# Patient Record
Sex: MALE | Race: BLACK OR AFRICAN AMERICAN | Employment: OTHER | URBAN - METROPOLITAN AREA
[De-identification: names, ages, dates, MRNs, and addresses within clinical notes are randomized per-mention and may not be internally consistent; named-entity substitution may affect disease eponyms.]

---

## 2022-08-16 ENCOUNTER — HOSPITAL ENCOUNTER (EMERGENCY)
Age: 37
Discharge: HOME | End: 2022-08-16
Attending: EMERGENCY MEDICINE
Payer: OTHER GOVERNMENT

## 2022-08-16 VITALS
TEMPERATURE: 98.2 F | SYSTOLIC BLOOD PRESSURE: 161 MMHG | WEIGHT: 204 LBS | HEART RATE: 91 BPM | HEIGHT: 67 IN | BODY MASS INDEX: 32.02 KG/M2 | OXYGEN SATURATION: 98 % | DIASTOLIC BLOOD PRESSURE: 107 MMHG | RESPIRATION RATE: 18 BRPM

## 2022-08-16 DIAGNOSIS — U07.1 POSITIVE SELF-ADMINISTERED ANTIGEN TEST FOR COVID-19: Primary | ICD-10-CM

## 2022-08-16 PROCEDURE — 99282 EMERGENCY DEPT VISIT SF MDM: CPT

## 2022-08-16 NOTE — ED TRIAGE NOTES
Started Monday runny, yellow mucus productive cough, taste lost, sinus pressure, no sob, chest pain or N, V, diarrhea.   Wants to be checked out.+ covid yesterday, and vaccinated x 2 for covid

## 2022-08-16 NOTE — Clinical Note
[address] [id number]  [phone]    Work/School Note    Date: 8/16/2022     To Whom It May concern:    Christell Ahumada was evaluated by the following provider(s):  Attending Provider: Roxjessica Messing virus is suspected. Per the CDC guidelines we recommend home isolation until the following conditions are all met:    1. At least five days have passed since symptoms first appeared and/or had a close exposure,   2. After home isolation for five days, wearing a mask around others for the next five days,  3. At least 24 have passed since last fever without the use of fever-reducing medications and  4.  Symptoms (eg cough, shortness of breath) have improved      Sincerely,          Nicole Rodriguez,

## 2022-08-16 NOTE — ED PROVIDER NOTES
EMERGENCY DEPARTMENT HISTORY AND PHYSICAL EXAM      Date: 8/16/2022  Patient Name: Ricky Murrell    History of Presenting Illness     Chief Complaint   Patient presents with    Positive For Covid-19    Cough    Sinus Infection       History Provided By: Patient    HPI: Ricky Murrell, 31 y.o. male presents to the ED with CC of positive at-home COVID test.  Patient reports congestion, sore throat, cough starting yesterday morning. States that he took an at home COVID test yesterday which did return positive. Patient presents to the emergency department by request of his wife. Patient has no significant complaints at this time. He is fully vaccinated. Patient denies SOB, chest pain, or any neurological symptoms. There are no other complaints, changes, or physical findings at this time. Past History     Past Medical History:  Past Medical History:   Diagnosis Date    HTN (hypertension)        Allergies: Allergies   Allergen Reactions    Pcn [Penicillins] Anaphylaxis       Review of Systems   Vital signs and nursing notes reviewed  Review of Systems   Constitutional:  Negative for chills and fever. HENT:  Positive for rhinorrhea. Negative for congestion. Loss of taste   Eyes:  Negative for photophobia and visual disturbance. Respiratory:  Positive for cough. Negative for shortness of breath. Cardiovascular:  Negative for chest pain and palpitations. Gastrointestinal:  Negative for abdominal pain, diarrhea, nausea and vomiting. Genitourinary:  Negative for difficulty urinating and dysuria. Musculoskeletal:  Negative for arthralgias and myalgias. Skin:  Negative for color change and rash. Neurological:  Negative for weakness and headaches. Psychiatric/Behavioral:  Negative for dysphoric mood and sleep disturbance.         Physical Exam   Visit Vitals  BP (!) 161/107 (BP 1 Location: Left upper arm, BP Patient Position: Sitting)   Pulse 91   Temp 98.2 °F (36.8 °C)   Resp 18   Ht 5' 6.5\" (1.689 m)   Wt 92.5 kg (204 lb)   SpO2 98%   BMI 32.43 kg/m²     CONSTITUTIONAL: Alert, in no distress. Appears stated age. HEAD:  Normocephalic, atraumatic  EYES: EOM intact. No conjunctival injection or scleral icterus  Neck:  Supple. No meningismus  RESP: Normal with no work of breathing, speaking in full sentences. CV: Well perfused. NEURO: Alert with normal mentation, moving extremities spontaneously  PSYCH: Normal mood, normal affect      Medical Decision Making   Patient presents for evaluation of positive COVID-19 test at home. With normal oxygen saturation and mild URI symptoms or COVID 19 exposure. COVID 19 testing was not conducted. The patient was given quarantine/isolation recommendations and agrees with the plan to be discharged home. They were provided instructions to return for difficulty breathing, chest pain, altered mentation, or any other new or worsening symptoms. ED Course:   Initial assessment performed. The patients presenting problems have been discussed, and they are in agreement with the care plan formulated and outlined with them. I have encouraged them to ask questions as they arise throughout their visit. Critical Care Time: None    Disposition:  DISCHARGE NOTE:  The pt is ready for discharge. The pt's signs, symptoms, diagnosis, and discharge instructions have been discussed and pt has conveyed their understanding. The pt is to follow up as recommended or return to ER should their symptoms worsen. Plan has been discussed and pt is in agreement. PLAN:  1. There are no discharge medications for this patient. 2.   Follow-up Information       Follow up With Specialties Details Why Contact Info    [address] [facility]  As needed, If symptoms worsen [address] [place] [id number] [phone]          3. COVID Testing results will be called once available if positive.  Patient should utilize [employer] to access results. 4. Take Tylenol or Ibuprofen as needed  5. Drink plenty of fluids  6. Return to ED if worse especially if any shortness of breath, chest pain or altered mentation. Diagnosis     Clinical Impression:   1. Positive self-administered antigen test for COVID-19        Please note that this dictation was completed with [employer], the computer voice recognition software. Quite often unanticipated grammatical, syntax, homophones, and other interpretive errors are inadvertently transcribed by the computer software. Please disregards these errors. Please excuse any errors that have escaped final proofreading.